# Patient Record
Sex: FEMALE | Race: WHITE | NOT HISPANIC OR LATINO | URBAN - METROPOLITAN AREA
[De-identification: names, ages, dates, MRNs, and addresses within clinical notes are randomized per-mention and may not be internally consistent; named-entity substitution may affect disease eponyms.]

---

## 2018-01-05 ENCOUNTER — INPATIENT (INPATIENT)
Facility: HOSPITAL | Age: 38
LOS: 1 days | Discharge: HOME | End: 2018-01-07
Attending: OBSTETRICS & GYNECOLOGY | Admitting: OBSTETRICS & GYNECOLOGY
Payer: COMMERCIAL

## 2018-01-05 VITALS — WEIGHT: 134.48 LBS | HEIGHT: 63 IN

## 2018-01-05 DIAGNOSIS — O26.899 OTHER SPECIFIED PREGNANCY RELATED CONDITIONS, UNSPECIFIED TRIMESTER: ICD-10-CM

## 2018-01-05 DIAGNOSIS — Z3A.00 WEEKS OF GESTATION OF PREGNANCY NOT SPECIFIED: ICD-10-CM

## 2018-01-05 LAB
BASOPHILS NFR BLD AUTO: 0.3 % — SIGNIFICANT CHANGE UP (ref 0–2)
BLD GP AB SCN SERPL QL: NEGATIVE — SIGNIFICANT CHANGE UP
EOSINOPHIL NFR BLD AUTO: 0.3 % — SIGNIFICANT CHANGE UP (ref 0–6)
HCT VFR BLD CALC: 38.8 % — SIGNIFICANT CHANGE UP (ref 34.5–45)
HGB BLD-MCNC: 13.3 G/DL — SIGNIFICANT CHANGE UP (ref 11.5–15.5)
HIV 1+2 AB+HIV1 P24 AG SERPL QL IA: SIGNIFICANT CHANGE UP
LYMPHOCYTES # BLD AUTO: 19.7 % — SIGNIFICANT CHANGE UP (ref 13–44)
MCHC RBC-ENTMCNC: 32.1 PG — SIGNIFICANT CHANGE UP (ref 27–34)
MCHC RBC-ENTMCNC: 34.3 G/DL — SIGNIFICANT CHANGE UP (ref 32–36)
MCV RBC AUTO: 93.7 FL — SIGNIFICANT CHANGE UP (ref 80–100)
MONOCYTES NFR BLD AUTO: 6.8 % — SIGNIFICANT CHANGE UP (ref 2–14)
NEUTROPHILS NFR BLD AUTO: 72.9 % — SIGNIFICANT CHANGE UP (ref 43–77)
PLATELET # BLD AUTO: 132 K/UL — LOW (ref 150–400)
RBC # BLD: 4.14 M/UL — SIGNIFICANT CHANGE UP (ref 3.8–5.2)
RBC # FLD: 14.1 % — SIGNIFICANT CHANGE UP (ref 10.3–16.9)
RH IG SCN BLD-IMP: POSITIVE — SIGNIFICANT CHANGE UP
T PALLIDUM AB TITR SER: NEGATIVE — SIGNIFICANT CHANGE UP
WBC # BLD: 8 K/UL — SIGNIFICANT CHANGE UP (ref 3.8–10.5)
WBC # FLD AUTO: 8 K/UL — SIGNIFICANT CHANGE UP (ref 3.8–10.5)

## 2018-01-05 RX ORDER — DIPHENHYDRAMINE HCL 50 MG
25 CAPSULE ORAL EVERY 6 HOURS
Qty: 0 | Refills: 0 | Status: DISCONTINUED | OUTPATIENT
Start: 2018-01-05 | End: 2018-01-07

## 2018-01-05 RX ORDER — HYDROCORTISONE 1 %
1 OINTMENT (GRAM) TOPICAL EVERY 4 HOURS
Qty: 0 | Refills: 0 | Status: DISCONTINUED | OUTPATIENT
Start: 2018-01-05 | End: 2018-01-07

## 2018-01-05 RX ORDER — SODIUM CHLORIDE 9 MG/ML
1000 INJECTION, SOLUTION INTRAVENOUS
Qty: 0 | Refills: 0 | Status: DISCONTINUED | OUTPATIENT
Start: 2018-01-05 | End: 2018-01-05

## 2018-01-05 RX ORDER — CITRIC ACID/SODIUM CITRATE 300-500 MG
15 SOLUTION, ORAL ORAL ONCE
Qty: 0 | Refills: 0 | Status: DISCONTINUED | OUTPATIENT
Start: 2018-01-05 | End: 2018-01-05

## 2018-01-05 RX ORDER — DIBUCAINE 1 %
1 OINTMENT (GRAM) RECTAL EVERY 4 HOURS
Qty: 0 | Refills: 0 | Status: DISCONTINUED | OUTPATIENT
Start: 2018-01-05 | End: 2018-01-07

## 2018-01-05 RX ORDER — MAGNESIUM HYDROXIDE 400 MG/1
30 TABLET, CHEWABLE ORAL
Qty: 0 | Refills: 0 | Status: DISCONTINUED | OUTPATIENT
Start: 2018-01-05 | End: 2018-01-07

## 2018-01-05 RX ORDER — OXYTOCIN 10 UNIT/ML
333.33 VIAL (ML) INJECTION
Qty: 20 | Refills: 0 | Status: DISCONTINUED | OUTPATIENT
Start: 2018-01-05 | End: 2018-01-05

## 2018-01-05 RX ORDER — SODIUM CHLORIDE 9 MG/ML
3 INJECTION INTRAMUSCULAR; INTRAVENOUS; SUBCUTANEOUS EVERY 8 HOURS
Qty: 0 | Refills: 0 | Status: DISCONTINUED | OUTPATIENT
Start: 2018-01-05 | End: 2018-01-07

## 2018-01-05 RX ORDER — ACETAMINOPHEN 500 MG
650 TABLET ORAL EVERY 6 HOURS
Qty: 0 | Refills: 0 | Status: DISCONTINUED | OUTPATIENT
Start: 2018-01-05 | End: 2018-01-07

## 2018-01-05 RX ORDER — PENICILLIN G POTASSIUM 5000000 [IU]/1
2.5 POWDER, FOR SOLUTION INTRAMUSCULAR; INTRAPLEURAL; INTRATHECAL; INTRAVENOUS EVERY 4 HOURS
Qty: 0 | Refills: 0 | Status: DISCONTINUED | OUTPATIENT
Start: 2018-01-05 | End: 2018-01-05

## 2018-01-05 RX ORDER — PRAMOXINE HYDROCHLORIDE 150 MG/15G
1 AEROSOL, FOAM RECTAL EVERY 4 HOURS
Qty: 0 | Refills: 0 | Status: DISCONTINUED | OUTPATIENT
Start: 2018-01-05 | End: 2018-01-07

## 2018-01-05 RX ORDER — IBUPROFEN 200 MG
600 TABLET ORAL EVERY 6 HOURS
Qty: 0 | Refills: 0 | Status: DISCONTINUED | OUTPATIENT
Start: 2018-01-05 | End: 2018-01-07

## 2018-01-05 RX ORDER — TETANUS TOXOID, REDUCED DIPHTHERIA TOXOID AND ACELLULAR PERTUSSIS VACCINE, ADSORBED 5; 2.5; 8; 8; 2.5 [IU]/.5ML; [IU]/.5ML; UG/.5ML; UG/.5ML; UG/.5ML
0.5 SUSPENSION INTRAMUSCULAR ONCE
Qty: 0 | Refills: 0 | Status: COMPLETED | OUTPATIENT
Start: 2018-01-05 | End: 2018-12-04

## 2018-01-05 RX ORDER — OXYTOCIN 10 UNIT/ML
41.67 VIAL (ML) INJECTION
Qty: 20 | Refills: 0 | Status: DISCONTINUED | OUTPATIENT
Start: 2018-01-05 | End: 2018-01-07

## 2018-01-05 RX ORDER — GLYCERIN ADULT
1 SUPPOSITORY, RECTAL RECTAL AT BEDTIME
Qty: 0 | Refills: 0 | Status: DISCONTINUED | OUTPATIENT
Start: 2018-01-05 | End: 2018-01-07

## 2018-01-05 RX ORDER — DOCUSATE SODIUM 100 MG
100 CAPSULE ORAL
Qty: 0 | Refills: 0 | Status: DISCONTINUED | OUTPATIENT
Start: 2018-01-05 | End: 2018-01-07

## 2018-01-05 RX ORDER — PENICILLIN G POTASSIUM 5000000 [IU]/1
POWDER, FOR SOLUTION INTRAMUSCULAR; INTRAPLEURAL; INTRATHECAL; INTRAVENOUS
Qty: 0 | Refills: 0 | Status: DISCONTINUED | OUTPATIENT
Start: 2018-01-05 | End: 2018-01-05

## 2018-01-05 RX ORDER — OXYCODONE AND ACETAMINOPHEN 5; 325 MG/1; MG/1
2 TABLET ORAL EVERY 6 HOURS
Qty: 0 | Refills: 0 | Status: DISCONTINUED | OUTPATIENT
Start: 2018-01-05 | End: 2018-01-07

## 2018-01-05 RX ORDER — SODIUM CHLORIDE 9 MG/ML
1000 INJECTION, SOLUTION INTRAVENOUS ONCE
Qty: 0 | Refills: 0 | Status: DISCONTINUED | OUTPATIENT
Start: 2018-01-05 | End: 2018-01-05

## 2018-01-05 RX ORDER — OXYTOCIN 10 UNIT/ML
1 VIAL (ML) INJECTION
Qty: 30 | Refills: 0 | Status: DISCONTINUED | OUTPATIENT
Start: 2018-01-05 | End: 2018-01-07

## 2018-01-05 RX ORDER — AER TRAVELER 0.5 G/1
1 SOLUTION RECTAL; TOPICAL EVERY 4 HOURS
Qty: 0 | Refills: 0 | Status: DISCONTINUED | OUTPATIENT
Start: 2018-01-05 | End: 2018-01-07

## 2018-01-05 RX ORDER — SIMETHICONE 80 MG/1
80 TABLET, CHEWABLE ORAL EVERY 6 HOURS
Qty: 0 | Refills: 0 | Status: DISCONTINUED | OUTPATIENT
Start: 2018-01-05 | End: 2018-01-07

## 2018-01-05 RX ORDER — PENICILLIN G POTASSIUM 5000000 [IU]/1
5 POWDER, FOR SOLUTION INTRAMUSCULAR; INTRAPLEURAL; INTRATHECAL; INTRAVENOUS ONCE
Qty: 0 | Refills: 0 | Status: COMPLETED | OUTPATIENT
Start: 2018-01-05 | End: 2018-01-05

## 2018-01-05 RX ORDER — LANOLIN
1 OINTMENT (GRAM) TOPICAL EVERY 6 HOURS
Qty: 0 | Refills: 0 | Status: DISCONTINUED | OUTPATIENT
Start: 2018-01-05 | End: 2018-01-07

## 2018-01-05 RX ADMIN — PENICILLIN G POTASSIUM 200 MILLION UNIT(S): 5000000 POWDER, FOR SOLUTION INTRAMUSCULAR; INTRAPLEURAL; INTRATHECAL; INTRAVENOUS at 17:31

## 2018-01-05 RX ADMIN — SODIUM CHLORIDE 125 MILLILITER(S): 9 INJECTION, SOLUTION INTRAVENOUS at 09:48

## 2018-01-05 RX ADMIN — PENICILLIN G POTASSIUM 200 MILLION UNIT(S): 5000000 POWDER, FOR SOLUTION INTRAMUSCULAR; INTRAPLEURAL; INTRATHECAL; INTRAVENOUS at 13:24

## 2018-01-05 RX ADMIN — PENICILLIN G POTASSIUM 200 MILLION UNIT(S): 5000000 POWDER, FOR SOLUTION INTRAMUSCULAR; INTRAPLEURAL; INTRATHECAL; INTRAVENOUS at 09:49

## 2018-01-05 RX ADMIN — Medication 1 MILLIUNIT(S)/MIN: at 11:12

## 2018-01-05 NOTE — DISCHARGE NOTE OB - MATERIALS PROVIDED
Bottle Feeding Log/[facility] Hearing Screen Program/Shaken Baby Prevention Handout/Breastfeeding Guide and Packet/Tdap Vaccination (VIS Pub Date: 2012)/Vaccinations/[facility]  Screening Program/Breastfeeding Mother’s Support Group Information/Guide to Postpartum Care/Back To Sleep Handout/Discharge Medication Information for Patients and Families Pocket Guide/  Immunization Record/Breastfeeding Log/Birth Certificate Instructions

## 2018-01-05 NOTE — DISCHARGE NOTE OB - PATIENT PORTAL LINK FT
“You can access the FollowHealth Patient Portal, offered by [facility], by registering with the following website: [email or web address]”

## 2018-01-05 NOTE — DISCHARGE NOTE OB - CARE PROVIDER_API CALL
Omid Woodward), Obstetrics and Gynecology  [id number]  [address]  [place] [address]  Phone: [phone]  Fax: [phone]

## 2018-01-05 NOTE — DISCHARGE NOTE OB - CARE PLAN
Principal Discharge DX:	Postpartum state  Goal:	home  Instructions for follow-up, activity and diet:	md office in 6 weeks

## 2018-01-06 RX ADMIN — Medication 600 MILLIGRAM(S): at 08:15

## 2018-01-06 RX ADMIN — Medication 1 APPLICATION(S): at 07:36

## 2018-01-06 RX ADMIN — Medication 1 TABLET(S): at 12:17

## 2018-01-06 RX ADMIN — SODIUM CHLORIDE 3 MILLILITER(S): 9 INJECTION INTRAMUSCULAR; INTRAVENOUS; SUBCUTANEOUS at 06:52

## 2018-01-06 RX ADMIN — SODIUM CHLORIDE 3 MILLILITER(S): 9 INJECTION INTRAMUSCULAR; INTRAVENOUS; SUBCUTANEOUS at 22:07

## 2018-01-06 RX ADMIN — SODIUM CHLORIDE 3 MILLILITER(S): 9 INJECTION INTRAMUSCULAR; INTRAVENOUS; SUBCUTANEOUS at 14:00

## 2018-01-06 RX ADMIN — Medication 100 MILLIGRAM(S): at 12:18

## 2018-01-06 RX ADMIN — Medication 600 MILLIGRAM(S): at 07:36

## 2018-01-06 NOTE — LACTATION INITIAL EVALUATION - INFANT FEEDING PLAN COMMENT, OB PROFILE
Baby 37 weeks, spontaneous labor, 5lbs 15, 19 hours old, 1 urine, 2 stools. Mother exclusively  previous infant for 15 months successfully. Difficulties with fast letdown. Reports this baby latching well with strong sustained latch. No discomfort during feedings. Given mother's experience level, report and weight and diaper count, supplementation/triple feeding not necessary at this time. LC will follow up tomorrow.

## 2018-01-06 NOTE — PROGRESS NOTE ADULT - SUBJECTIVE AND OBJECTIVE BOX
Patient evaluated at bedside.  No acute events overnight.  She reports pain is well controlled with OPM.  She denies heavy vaginal bleeding or perineal discomfort.  She has been ambulating without assistance, voiding spontaneously, and is breastfeeding.    Physical Exam:  T(C): 36.7 (01-06-18 @ 00:04), Max: 36.7 (01-06-18 @ 00:04)  HR: 72 (01-06-18 @ 00:04) (72 - 100)  BP: 110/65 (01-06-18 @ 00:04) (100/59 - 110/65)  RR: 18 (01-06-18 @ 00:04) (17 - 18)  SpO2: --  Wt(kg): --    GA: NAD, AAOx3  Abd: soft, nontender, nondistended, no rebound or guarding, uterus firm at midline,   fundus below umbilicus  : lochia WNL  Extremities: no swelling or calf tenderness                            13.3   8.0   )-----------( 132      ( 05 Jan 2018 09:57 )             38.8

## 2018-01-07 VITALS
SYSTOLIC BLOOD PRESSURE: 94 MMHG | DIASTOLIC BLOOD PRESSURE: 60 MMHG | HEART RATE: 60 BPM | OXYGEN SATURATION: 99 % | RESPIRATION RATE: 18 BRPM | TEMPERATURE: 98 F

## 2018-01-07 PROCEDURE — 85025 COMPLETE CBC W/AUTO DIFF WBC: CPT

## 2018-01-07 PROCEDURE — 86850 RBC ANTIBODY SCREEN: CPT

## 2018-01-07 PROCEDURE — 86780 TREPONEMA PALLIDUM: CPT

## 2018-01-07 PROCEDURE — 86901 BLOOD TYPING SEROLOGIC RH(D): CPT

## 2018-01-07 PROCEDURE — 87389 HIV-1 AG W/HIV-1&-2 AB AG IA: CPT

## 2018-01-07 PROCEDURE — 90715 TDAP VACCINE 7 YRS/> IM: CPT

## 2018-01-07 PROCEDURE — 90686 IIV4 VACC NO PRSV 0.5 ML IM: CPT

## 2018-01-07 PROCEDURE — 99214 OFFICE O/P EST MOD 30 MIN: CPT

## 2018-01-07 PROCEDURE — 36415 COLL VENOUS BLD VENIPUNCTURE: CPT

## 2018-01-07 PROCEDURE — 86900 BLOOD TYPING SEROLOGIC ABO: CPT

## 2018-01-07 RX ORDER — MULTIVIT-MIN/FERROUS GLUCONATE 9 MG/15 ML
1 LIQUID (ML) ORAL
Qty: 0 | Refills: 0 | COMMUNITY

## 2018-01-07 RX ORDER — TETANUS TOXOID, REDUCED DIPHTHERIA TOXOID AND ACELLULAR PERTUSSIS VACCINE, ADSORBED 5; 2.5; 8; 8; 2.5 [IU]/.5ML; [IU]/.5ML; UG/.5ML; UG/.5ML; UG/.5ML
0.5 SUSPENSION INTRAMUSCULAR ONCE
Qty: 0 | Refills: 0 | Status: COMPLETED | OUTPATIENT
Start: 2018-01-07 | End: 2018-01-07

## 2018-01-07 RX ORDER — INFLUENZA VIRUS VACCINE 15; 15; 15; 15 UG/.5ML; UG/.5ML; UG/.5ML; UG/.5ML
0.5 SUSPENSION INTRAMUSCULAR ONCE
Qty: 0 | Refills: 0 | Status: COMPLETED | OUTPATIENT
Start: 2018-01-07 | End: 2018-01-07

## 2018-01-07 RX ADMIN — Medication 600 MILLIGRAM(S): at 00:11

## 2018-01-07 RX ADMIN — INFLUENZA VIRUS VACCINE 0.5 MILLILITER(S): 15; 15; 15; 15 SUSPENSION INTRAMUSCULAR at 11:17

## 2018-01-07 RX ADMIN — Medication 600 MILLIGRAM(S): at 01:01

## 2018-01-07 RX ADMIN — Medication 1 TABLET(S): at 11:27

## 2018-01-07 RX ADMIN — TETANUS TOXOID, REDUCED DIPHTHERIA TOXOID AND ACELLULAR PERTUSSIS VACCINE, ADSORBED 0.5 MILLILITER(S): 5; 2.5; 8; 8; 2.5 SUSPENSION INTRAMUSCULAR at 11:16

## 2018-01-07 RX ADMIN — Medication 600 MILLIGRAM(S): at 06:47

## 2018-01-07 RX ADMIN — Medication 600 MILLIGRAM(S): at 05:53

## 2018-01-07 RX ADMIN — SODIUM CHLORIDE 3 MILLILITER(S): 9 INJECTION INTRAMUSCULAR; INTRAVENOUS; SUBCUTANEOUS at 06:52

## 2018-01-07 NOTE — PROGRESS NOTE ADULT - ASSESSMENT
A/P 37y s/p , PPD # 2 , stable, meeting postpartum milestones   - Pain: well controlled on motrin  - GI: Tolerating regular diet, colace PRN  - : urinating without difficulty/pain  -DVT prophylaxis: ambulating frequently  -Dispo: PPD 2, ready to go home today, leg pain likely secondary to strain/muscle cramp, reevaul prior to discharge

## 2018-01-07 NOTE — PROVIDER CONTACT NOTE (OTHER) - SITUATION
Patient reports left calf strain discomfort. Denies pain. No swelling, redness, negative homen's sign. Denies SOB, chest pain.

## 2018-01-07 NOTE — PROGRESS NOTE ADULT - SUBJECTIVE AND OBJECTIVE BOX
Patient evaluated at bedside this morning, resting comfortable in bed, no acute events overnight.  She reports pain is well controlled with motrin. Reported this morning to nursing she had some left calf pain when walking that felt like soarness. Denies any pain when sitting still , no tenderness or redness on legs.   She denies headache, dizziness, chest pain, palpitations, shortness of breath, nausea, vomiting, heavy vaginal bleeding or perineal discomfort. Reports decrease in amount of vaginal bleeding and denies clots.  She has been ambulating without assistance, voiding spontaneously, and is breastfeeding.   Tolerating food well, without nausea/vomit.  Passing flatus.     Physical Exam:  T(C): 36.6 (01-07-18 @ 06:00), Max: 36.6 (01-07-18 @ 06:00)  HR: 60 (01-07-18 @ 06:00) (60 - 60)  BP: 94/60 (01-07-18 @ 06:00) (94/60 - 94/60)  RR: 18 (01-07-18 @ 06:00) (18 - 18)  SpO2: 99% (01-07-18 @ 06:00) (99% - 99%)    GA: NAD, A&O x 3  CV: RRR, no murmurs, rubs, or gallops  Pulm: clear breath sounds throughout, no rales, rhonchi, wheezes  Abd: + BS, soft, nontender, nondistended, no rebound or guarding, uterus firm at midline, uterus firm and   2 fb below umbilicus  Extremities: no swelling or calf tenderness, Citlalli negative            acetaminophen   Tablet 650 milliGRAM(s) Oral every 6 hours PRN  acetaminophen   Tablet. 650 milliGRAM(s) Oral every 6 hours PRN  dibucaine 1% Ointment 1 Application(s) Topical every 4 hours PRN  diphenhydrAMINE   Capsule 25 milliGRAM(s) Oral every 6 hours PRN  diphtheria/tetanus/pertussis (acellular) Vaccine (ADAcel) 0.5 milliLiter(s) IntraMuscular once  docusate sodium 100 milliGRAM(s) Oral two times a day PRN  glycerin Suppository - Adult 1 Suppository(s) Rectal at bedtime PRN  hydrocortisone 1% Cream 1 Application(s) Topical every 4 hours PRN  ibuprofen  Tablet 600 milliGRAM(s) Oral every 6 hours PRN  influenza   Vaccine 0.5 milliLiter(s) IntraMuscular once  lanolin Ointment 1 Application(s) Topical every 6 hours PRN  magnesium hydroxide Suspension 30 milliLiter(s) Oral two times a day PRN  oxyCODONE    5 mG/acetaminophen 325 mG 2 Tablet(s) Oral every 6 hours PRN  oxytocin Infusion 41.667 milliUNIT(s)/Min IV Continuous <Continuous>  oxytocin Infusion 1 milliUNIT(s)/Min IV Continuous <Continuous>  pramoxine 1%/zinc 5% Cream 1 Application(s) Topical every 4 hours PRN  prenatal multivitamin 1 Tablet(s) Oral daily  simethicone 80 milliGRAM(s) Chew every 6 hours PRN  sodium chloride 0.9% lock flush 3 milliLiter(s) IV Push every 8 hours  witch hazel Pads 1 Application(s) Topical every 4 hours PRN

## 2018-01-09 DIAGNOSIS — Z23 ENCOUNTER FOR IMMUNIZATION: ICD-10-CM

## 2018-01-09 DIAGNOSIS — Z3A.37 37 WEEKS GESTATION OF PREGNANCY: ICD-10-CM

## 2018-01-09 DIAGNOSIS — Z34.83 ENCOUNTER FOR SUPERVISION OF OTHER NORMAL PREGNANCY, THIRD TRIMESTER: ICD-10-CM

## 2019-02-01 NOTE — PATIENT PROFILE OB - SPIRITUAL CULTURAL, RELIGIOUS PRACTICES/VALUES, PROFILE
Quality 226: Preventive Care And Screening: Tobacco Use: Screening And Cessation Intervention: Patient screened for tobacco use and is an ex/non-smoker Quality 130: Documentation Of Current Medications In The Medical Record: Current Medications Documented Detail Level: Detailed Quality 431: Preventive Care And Screening: Unhealthy Alcohol Use - Screening: Patient screened for unhealthy alcohol use using a single question and scores 2 or greater episodes per year and brief intervention did not occur Additional Notes: No flu vaccine due to personal choice Quality 110: Preventive Care And Screening: Influenza Immunization: Influenza Immunization not Administered for Documented Reasons. none

## 2020-03-01 ENCOUNTER — EMERGENCY (EMERGENCY)
Facility: HOSPITAL | Age: 40
LOS: 1 days | Discharge: HOME | End: 2020-03-01
Admitting: EMERGENCY MEDICINE
Payer: COMMERCIAL

## 2020-03-01 VITALS
WEIGHT: 104.06 LBS | DIASTOLIC BLOOD PRESSURE: 86 MMHG | HEART RATE: 80 BPM | OXYGEN SATURATION: 100 % | HEIGHT: 63 IN | RESPIRATION RATE: 18 BRPM | SYSTOLIC BLOOD PRESSURE: 132 MMHG | TEMPERATURE: 99 F
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
## 2020-03-01 DIAGNOSIS — K13.0 DISEASES OF LIPS: ICD-10-CM
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
## 2020-03-01 DIAGNOSIS — S01.511A LACERATION WITHOUT FOREIGN BODY OF LIP, INITIAL ENCOUNTER: ICD-10-CM

## 2020-03-01 PROCEDURE — 13152 CMPLX RPR E/N/E/L 2.6-7.5 CM: CPT

## 2020-03-01 PROCEDURE — 99284 EMERGENCY DEPT VISIT MOD MDM: CPT

## 2020-03-01 PROCEDURE — 99285 EMERGENCY DEPT VISIT HI MDM: CPT | Mod: 25

## 2020-03-01 PROCEDURE — 13132 CMPLX RPR F/C/C/M/N/AX/G/H/F: CPT

## 2020-03-01 NOTE — ED PROVIDER NOTE - SKIN, MLM
Skin normal color for race, warm, dry and intact. No evidence of rash. 1 and 1/2 cm laceration at the inferior to the lip that goes through and through Skin normal color for race, warm, dry and intact. No evidence of rash. 1 and 1/2 cm laceration  inferior to the lip that goes through and through

## 2020-03-01 NOTE — ED PROVIDER NOTE - ENMT, MLM
Airway patent, Nasal mucosa clear. Mouth with normal mucosa. Throat has no vesicles, no oropharyngeal exudates and uvula is midline. No broken teeth.

## 2020-03-01 NOTE — ED PROVIDER NOTE - NSFOLLOWUPINSTRUCTIONS_ED_ALL_ED_FT
Follow up with Dr. Brock this week. Continue current antibiotics until finish.     Mouth Laceration  A mouth laceration is a deep cut inside your mouth. The cut may go into your lip or go all the way through your mouth and cheek. The cut may also affect your tongue, the insides of your cheeks, or the upper surface of your mouth (palate).  Mouth lacerations may bleed a lot and may need to be treated with stitches (sutures).  Follow these instructions at home:  Medicines     Take over-the-counter and prescription medicines only as told by your doctor.If you were given an antibiotic medicine, take or apply it as told by your doctor. Do not stop using the antibiotic even if you start to feel better.Do not drive or use heavy machinery while taking prescription pain medicine.Wound care     Check your wound every day for signs of infection. It is normal to have a white or gray patch over your wound while it heals. Watch for:  Redness.Swelling.Blood or pus.Gently gargle and rinse your mouth 4–6 times a day. Spit out the liquid. Do not swallow.Use the rinse solution as told by your doctor. The most used types of rinse include:  Antibacterial rinse (chlorhexidine).Saline rinse.Do not poke the stitches with your tongue. Doing that can loosen them.If you have a cut on your lip:  Keep the wound fully dry for the first 24 hours, or as told by your doctor. After that time, you may take a shower or a bath. Do not get the wound soaked in water until after the stitches have been removed.If you were given a bandage, change it at least once a day, or as told by your doctor. You should also change it if it gets wet or dirty.Clean the wound once a day, or as told by your doctor.After you clean the wound, put a thin layer of antibiotic ointment on it as told by your doctor. This ointment:  Helps to prevent infection.Keeps the bandage from sticking to the wound.General instructions        Eat a soft diet. Avoid hot foods or liquids for a few days.Rinse your mouth after eating.Keep your mouth and teeth clean (oral hygiene). Gently brush your teeth with a soft toothbrush 2 times a day.Do not use any products that contain nicotine or tobacco, such as cigarettes and e-cigarettes. These can delay healing. If you need help quitting, ask your doctor.Keep all follow-up visits as told by your doctor. This is important.Contact a doctor if:  Medicine does not help your pain.You have a fever.You have redness, swelling, or pain on your wound that is getting worse.You have fresh bleeding or pus coming from your wound.You have swollen or tender glands in your throat.Get help right away if:  The edges of your wound break open.Your face or the area under your jaw gets swollen.You have trouble breathing or swallowing.Summary  A mouth laceration is a deep cut inside your mouth.Mouth lacerations may bleed a lot and may need to be treated with stitches.Check your wound every day for signs of infection.Contact a doctor if you have fresh bleeding or you notice that pus is coming out of your wound.This information is not intended to replace advice given to you by your health care provider. Make sure you discuss any questions you have with your health care provider.    Document Released: 06/05/2009 Document Revised: 01/07/2019 Document Reviewed: 01/07/2019  [employer] Interactive Patient Education © 2020 [employer]

## 2020-03-01 NOTE — ED PROVIDER NOTE - PATIENT PORTAL LINK FT
You can access the FollowMyHealth Patient Portal offered by [facility] by registering at the following website: [email or web address]. By joining [email or web address]’s FollowMyHealth portal, you will also be able to view your health information using other applications (apps) compatible with our system.

## 2020-03-01 NOTE — ED PROVIDER NOTE - CLINICAL SUMMARY MEDICAL DECISION MAKING FREE TEXT BOX
Patient with a lip laceration was repaired by plastics. Dr. Brock. Well appearing, NAD and VSS. Td up to date.

## 2020-03-01 NOTE — ED PROVIDER NOTE - OBJECTIVE STATEMENT
39y F presents to the ED with complains of lip injury. Patient states she bent over to pick something up and hit her face on a chair. Patient states tetanus is up to date. Patient notes having oral surgery on Thursday and is on amoxicillin. Denies jaw pain. 39y F presents to the ED with complains of lip injury. Patient states she bent over to pick something up and hit her face on a chair. Patient states tetanus is up to date. Patient notes having oral surgery on Thursday and is currently on amoxicillin. Denies jaw pain, fever, drainage, numbness or tingling. No loose teeth.

## 2020-03-01 NOTE — ED PROVIDER NOTE - MUSCULOSKELETAL, MLM
Spine appears normal, range of motion is not limited, no muscle or joint tenderness; no c spine tenderness; no pain to TMJ; no palpable defect to submandible, nontender

## 2020-03-01 NOTE — ED ADULT NURSE NOTE - OBJECTIVE STATEMENT
c.o lac to bottom lip after bumping her lip into a chair. denies any loc, dizziness, use of blood thinners. no active bleeding noted. states she is UTD with tetanus vaccines

## 2021-06-26 NOTE — PATIENT PROFILE OB - AMNIOTIC FLUID ODOR, LABOR
Spoke with patient and he actually is taking the topamax 100 mg daily. Notified he does not need to  the prescription then that was sent today. Notified patient that per verbal from Adela, he will need to discuss different treatment options with him at this appointment.  Patient notified and verbalized understanding of information given, does not have any further questions at this time.    normal

## 2023-04-19 NOTE — ED ADULT NURSE NOTE - NS ED NURSE RECORD ANOTHER HT AND WT
"NAME: Yadira Flowers    : 1956    DATE:  2023    DIAGNOSIS:   1.  Stage IA ([id number]) moderately differentiated invasive ductal carcinoma of the right breast diagnosed in 2015.    2.  H/o malignant Melanoma on her Back    3.  H/o cervical cancer treated with cryotherapy at [facility] in     4.  Recurrent / metastatic malignant Melanoma  -  R axillary LN bx 22.    -  She has R axillary LN mass (2.47x2.16), Solitary cavitary R lobe liver lesion, cavitary RLL lung mass,  and bilateral parenchymal lung nodules      TREATMENT HISTORY:   1.         CHIEF COMPLAINT:  Follow up of metastatic melanoma/toxicity check and recent imaging    HISTORY OF PRESENT ILLNESS:   Yadira Flowers is a very pleasant 66 y.o. female who was referred by Dr. Dilcia Pedro for evaluation and treatment of breast cancer. She was living in [place] in 2015 when her dog brought attention to and \"found\" an abnormal lump in her right breast.  She was treated by Dr. Steve Isbell and underwent R lumpectomy with SLNBx on 10-22-15 as below.  She then had what sounds like accelerated partial breast irradiation.  She was then started on Arimidex which was later switched to Exemestane for a better side effect profile.  She took this for about 5 years, stopping a couple of weeks ago.  She stopped taking Exemestane because she developed some vaginal bleeding and hematuria.  This was evaluated by her PCP and gynecologist and felt to be due to vaginal atrophy.  She was prescribed a hormone pill which she hasn't yet started and was referred here to discuss things further.      Aside from bleeding which has been uncomfortable and distressing for her, Ms. Onur Burgos has generally been well.  The last year has been hard on her emotionally with the loss of her  and then her dog, her home and her insurance, but she is coping well and has good family support in [place].  She says she gained weight with her breast " cancer treatment and gained weight when her  .  She has been working now to lose weight and has lost 20 lbs over the last 2 months with dieting.  She denies chest pain or shortness of breath.  She complains of some RLQ cramping pain and isn't sure what is causing that.  She denies difficulty moving her bowels.  No blood in her stool that she is aware of.  She has had vaginal bleeding and hematuria as above.        INTERVAL HISTORY:  Ms. Burgos presents today for follow up of metastatic melanoma/toxicity check and recent imaging.  Since her last visit, she has overall been well.  She continues to take Nivolumab and is tolerating this well. She is anxious to discuss recent imaging.  Her only other complaint is that she has had some difficulty w/ acid reflux.  She started Protonix 20 mg QAM which has been helpful.        PAST MEDICAL HISTORY:  Past Medical History:   Diagnosis Date   • Back pain    • Basal cell carcinoma (BCC) in situ of skin     Dr. Mohr - Derm   • Breast cancer        • Cancer     breast, cervical, melanoma   • Cervical cancer     Diagnosed in .  Treated by repeated local intervention and ultimately received cryotherapy at [facility] with resolution.   • Elbow fracture    • Foot fracture    • Headache    • Hypertension     TAKEN OFF MEDS   • Melanoma     on her back  - treated by Dermatologist Catrachito Amaro in [place], [place] with what sounds like wide local excision.    • Pulmonary embolism    • Sinusitis        PAST SURGICAL HISTORY:  Past Surgical History:   Procedure Laterality Date   • AXILLARY LYMPH NODE BIOPSY/EXCISION Right 2022    Procedure: AXILLARY LYMPH NODE BIOPSY/EXCISION;  Surgeon: Dianelys Cummings MD;  Location: [facility];  Service: General;  Laterality: Right;   • BREAST LUMPECTOMY Right    • BREAST SURGERY      Secondary to cancer   • BRONCHOSCOPY Right 2022    Procedure: BRONCHOSCOPY WITH ENDOBRONCHIAL ULTRASOUND;  Surgeon: Chris Her  MD;  Location:  [facility];  Service: Pulmonary;  Laterality: Right;   • COLONOSCOPY N/A 2020    Procedure: COLONOSCOPY;  Surgeon: Mohsen Maldonado MD;  Location:  [facility];  Service: Gastroenterology;  Laterality: N/A;   • ELBOW FUSION      left   • FOOT SURGERY Right     Broken foot   • HAND SURGERY  2020    right   • RHINOPLASTY     • SKIN CANCER EXCISION      malignant melanoma from back    • US GUIDED LYMPH NODE BIOPSY  2022   • VENOUS ACCESS DEVICE (PORT) INSERTION N/A 2022    Procedure: INSERTION VENOUS ACCESS DEVICE left or right;  Surgeon: Dianelys Cummings MD;  Location:  [facility];  Service: General;  Laterality: N/A;       FAMILY HISTORY:  Family History   Problem Relation Age of Onset   • Other Mother         blood clots   • Hypertension Mother    • Ulcers Father    • Hypertension Sister    • Other Sister         Multiple Sclerosis   • Cancer Paternal Aunt    • Stroke Maternal Grandmother    • Alcohol abuse Paternal Grandmother    • Hypertension Sister    • Multiple sclerosis Sister    • Hypertension Sister    • Coronary artery disease Paternal Grandfather    • Cancer Maternal Aunt 30        colon   • Breast cancer Neg Hx    Many paternal aunts  with malignancy of various types. Many cousins on that side of the family have had cancer as well.  One  of colon cancer in her 30s.  One had a brain tumor.    SOCIAL HISTORY:  Social History     Socioeconomic History   • Marital status:    Tobacco Use   • Smoking status: Never   • Smokeless tobacco: Never   Vaping Use   • Vaping Use: Never used   Substance and Sexual Activity   • Alcohol use: Not Currently   • Drug use: Never   • Sexual activity: Not Currently     Birth control/protection: Post-menopausal       REVIEW OF SYSTEMS:   A comprehensive 14 point review of systems was performed.  Significant findings as mentioned above.  All other systems reviewed and are negative.      MEDICATIONS:  The current  medication list was reviewed in the EMR    Current Outpatient Medications:   •  apixaban (ELIQUIS) 2.5 MG tablet tablet, Take 1 tablet by mouth 2 (Two) Times a Day., Disp: 60 tablet, Rfl: 5  •  fluticasone (Flonase) 50 MCG/ACT nasal spray, 2 sprays into the nostril(s) as directed by provider Daily., Disp: 18.2 mL, Rfl: 3  •  lidocaine-prilocaine (EMLA) 2.5-2.5 % cream, Apply to port a cath ~30 min -1 hour prior to chemotherapy, Disp: 30 g, Rfl: 3  •  ondansetron ODT (Zofran ODT) 8 MG disintegrating tablet, Place 1 tablet on the tongue Every 8 (Eight) Hours As Needed for Nausea or Vomiting., Disp: 30 tablet, Rfl: 5  •  pantoprazole (Protonix) 20 MG EC tablet, Take 1 tablet by mouth Daily., Disp: 30 tablet, Rfl: 2  •  prochlorperazine (COMPAZINE) 10 MG tablet, TAKE 1 TABLET BY MOUTH EVERY 6 HOURS AS NEEDED FOR NAUSEA, Disp: 360 tablet, Rfl: 3  •  sennosides-docusate (senna-docusate sodium) 8.6-50 MG per tablet, Take 2 tablets by mouth 2 (Two) Times a Day., Disp: 120 tablet, Rfl: 2    ALLERGIES:    No Known Allergies    PHYSICAL EXAM:  Vitals:    04/20/23 1253   BP: 127/78   Pulse: 78   Resp: 18   Temp: 97.5 °F (36.4 °C)   SpO2: 97%     Pain Score    04/20/23 1253   PainSc: 0-No pain     ECOG score: 0   General:  Awake, alert and oriented, appears well.  HEENT:  Pupils are equal, round and reactive to light and accommodation, Extra-ocular movements full, Oropharyx clear, mucous membranes moist  Neck:  No JVD, thyromegaly or lymphadenopathy  CV:  Regular rate and rhythm, no murmurs, rubs or gallops  Resp:  Lungs are clear to auscultation bilaterally, no wheezing  Breast: Deferred today. Previously: S/p R lumpectomy. There is a somewhat firm area at the site of surgery but there are no palpable masses.  She is s/p excision of R axillary LN, with some post-surgical swelling.  Left breast is without mass lesions.  No L axillary adenopathy.  Abd:  Soft, non-tender, non-distended, bowel sounds present, no organomegaly or  masses.    Ext:  No clubbing, cyanosis or LE edema.    Lymph:  No cervical, supraclavicular, axillary adenopathy  Neuro:  MS as above, grossly non-focal exam    PATHOLOGY:  10-22-15            12-16-21          01-13-22          ENDOSCOPY:  Colonoscopy 12-09-20     Findings: 8 hyperplastic appearing polyps of the distal rectum, diverticulosis moderately throughout the sigmoid colon      IMAGING:  CTCAP 12-15-20  On the lung windows there are several calcified  granulomas in the lungs. No noncalcified pulmonary parenchymal lung  nodules were identified. On the soft tissue windows there were no  enlarged lymph nodes in the supraclavicular or axillary regions. No  mediastinal or hilar lymphadenopathy was seen. The heart was not  enlarged. There were no pericardial effusions.     IMPRESSION:  No CT findings of metastatic disease in the chest. There are  calcified granulomas in the lungs.     CT FINDINGS: The liver and spleen were normal in size and shape. The  gallbladder contains several stones. The wall was not thickened. The  bile ducts in the liver are not dilated. There is nothing seen to  suggest metastatic disease in the liver. The pancreas shows no evidence  of mass or inflammation. No adrenal or renal lesions are demonstrated.  The aorta is normal in caliber. There were no enlarged lymph nodes in  the retroperitoneum or in the mesentery. The bowel shows no evidence of  obstruction. In the pelvis there were no masses or fluid collections.  There were no ventral hernias.     IMPRESSION:  No CT findings of metastatic disease in the abdomen or  pelvis. This study does demonstrate several stones in the lumen of the  gallbladder.         Bilateral diagnostic mammogram 01-31-21  FINDINGS: There are scattered areas of fibroglandular density.     The bilateral fibroglandular pattern does not appear significantly  changed compared to the exam from 2020. This includes postoperative  changes in the right 12:00 region. Mild  skin thickening is noted  involving the right breast which is likely treatment related. A few  bilateral scattered calcifications are noted.     IMPRESSION:  Incomplete examination as comparison with older outside  prior mammograms is needed.        BI-RADS CATEGORY:   0, INCOMPLETE:  Need prior mammograms for comparison        RECOMMENDATION:  We will attempt to obtain older outside prior  mammograms for comparison.      CTCAP 12-12-21  FINDINGS: Lack of IV contrast hinders parenchymal assessment of the mediastinum, liver, spleen, pancreas, adrenal glands and kidneys.         Significant infiltrate seen in the right lower lobe with interstitial component. There are also lung nodules present in the right lung. Index nodule in the right lung base medially is 5 mm maximal diameter. Other smaller nodules seen throughout the right  lung. Tiny 3 mm lingular nodule seen in series 3 image 30. A benign calcified granuloma seen in the left lower lobe but a noncalcified indeterminant  5 mm left lower lobe nodule seen, series 3 image 42.     Nonenlarged mediastinal adenopathy seen. However there is an enlarged right axillary 1.8 x 2.6 cm lymph node partially seen.     Worrisome hepatic metastasis or primary hepatic lesion seen measuring 4.4 x 4.1 cm in liver segment 8. Margins are ill-defined. Abscess could have a similar appearance. Suggestion of subtle gallstones. Nonobstructing 2 mm right renal calculus seen. No  obstructive uropathy. Gaseous distention of the esophagus and hiatal hernia present consistent with GERD. No enteric contrast but no gross evidence of bowel obstruction.   There is no gross free air, free fluid or focal inflammatory change.  Uterus and  adnexa are not enlarged. Osseous structures are grossly intact.     IMPRESSION:  Constellation of findings with significant consolidation in the right lower lobe, enlarged right axillary lymph node, indeterminate bilateral lung nodules and ill-defined hepatic lesion  concerning for underlying malignancy in this patient .     FINDINGS: Lack of IV contrast hinders parenchymal assessment of the mediastinum, liver, spleen, pancreas, adrenal glands and kidneys.         Significant infiltrate seen in the right lower lobe with interstitial component. There are also lung nodules present in the right lung. Index nodule in the right lung base medially is 5 mm maximal diameter. Other smaller nodules seen throughout the right  lung. Tiny 3 mm lingular nodule seen in series 3 image 30. A benign calcified granuloma seen in the left lower lobe but a noncalcified indeterminant  5 mm left lower lobe nodule seen, series 3 image 42.     Nonenlarged mediastinal adenopathy seen. However there is an enlarged right axillary 1.8 x 2.6 cm lymph node partially seen.     Worrisome hepatic metastasis or primary hepatic lesion seen measuring 4.4 x 4.1 cm in liver segment 8. Margins are ill-defined. Abscess could have a similar appearance. Suggestion of subtle gallstones. Nonobstructing 2 mm right renal calculus seen. No  obstructive uropathy. Gaseous distention of the esophagus and hiatal hernia present consistent with GERD. No enteric contrast but no gross evidence of bowel obstruction.   There is no gross free air, free fluid or focal inflammatory change.  Uterus and  adnexa are not enlarged. Osseous structures are grossly intact.     IMPRESSION:  Constellation of findings with significant consolidation in the right lower lobe, enlarged right axillary lymph node, indeterminate bilateral lung nodules and ill-defined hepatic lesion concerning for underlying malignancy in this patient .       US Abdomen Complete 12-12-21  FINDINGS:  The visualized portions of the pancreas, IVC and aorta appear normal.        The liver is moderately coarsened in echotexture. Ill-defined hypodensity in the liver measuring 3 x 3.2 x 2.6 cm corresponds to recent CT. Unclear if this is a malignant lesion. Abscesses within the  differential but prior CT was performed without any  contrast.  The common bile duct is not dilated.. Gallbladder wall is thickened at 3.4 mm with trace pericholecystic fluid and subtle hyperemia. Gallstones and shadowing present.     The kidneys are normal in size and echogenicity. There is no   hydronephrosis or focal solid lesion.  The spleen is normal in size and echotexture.     IMPRESSION:  1. Nonspecific gallbladder wall thickening and questionable pericholecystic fluid. Gallstones are better seen on CT than ultrasound.  2.  Heterogeneous liver with lesion in the liver as seen on CT. Ultrasound is not helpful in determining etiology. This could be an abscess but given the other findings on CT,  malignancy is not excluded.      CTCAP 12-15-21  FINDINGS:    LUNGS:  Increased density of the now right lower lobe mixed  groundglass and more solid-appearing consolidation with internal  cavitary component identified. Tiny right and left lung pulmonary  nodules. Grossly stable.    PLEURAL SPACE:  Small bilateral pleural effusions.  No pneumothorax.    HEART:  Unremarkable.  No cardiomegaly.  No significant pericardial  effusion.    BONES/JOINTS:  Unremarkable.  No acute fracture.  No dislocation.    SOFT TISSUES:  Unremarkable.    VASCULATURE:  Unremarkable.  No thoracic aortic aneurysm.    LYMPH NODES:  Unremarkable.  No enlarged lymph nodes.     IMPRESSION:  1.  Increased density of the now right lower lobe mixed groundglass and  more solid-appearing consolidation with internal cavitary component  identified. Tiny right and left lung pulmonary nodules. Grossly stable.  2.  Small bilateral pleural effusions.    FINDINGS:    LUNG BASES:  Unremarkable.  No mass.  No consolidation.      ABDOMEN:    LIVER:  Right lobe of liver lesion is again identified now measuring  about 4.7 cm and was about 4.7 cm.    GALLBLADDER AND BILE DUCTS:  Gallstones noted in the gallbladder.  No  ductal dilation.    PANCREAS:  Unremarkable.   No mass.  No ductal dilation.    SPLEEN:  Unremarkable.  No splenomegaly.    ADRENALS:  Unremarkable.  No mass.    KIDNEYS AND URETERS:  Unremarkable.  No solid mass.  No  hydronephrosis.    STOMACH AND BOWEL:  Unremarkable.  No obstruction.  No mucosal  thickening.      PELVIS:    APPENDIX:  No findings to suggest acute appendicitis.    BLADDER:  Unremarkable.  No mass.    REPRODUCTIVE:  Unremarkable as visualized.      ABDOMEN and PELVIS:    INTRAPERITONEAL SPACE:  Unremarkable.  No free air.  No significant  fluid collection.    BONES/JOINTS:  No acute fracture.  No dislocation.    SOFT TISSUES:  Unremarkable.    VASCULATURE:  Unremarkable.  No abdominal aortic aneurysm.    LYMPH NODES:  Unremarkable.  No enlarged lymph nodes.     IMPRESSION:    Right lobe of liver lesion is again identified now measuring about 4.7  cm and was about 4.7 cm.      NM Bone Scan Whole Body 12-15-21  FINDINGS:    SKULL/FACIAL BONES:  No focal increased or decreased uptake.    SPINE:  Unremarkable.  No abnormal increased or decreased uptake.    RIBS:  Unremarkable.  No abnormal uptake.    LONG BONES:  Unremarkable.  No abnormal uptake.    PELVIS:  Unremarkable.  No focal increased or decreased uptake.    JOINTS:  Unremarkable.  No focal increased or decreased uptake.    SOFT TISSUES:  Unremarkable.    RENAL/BLADDER:  Within normal limits.     IMPRESSION:    Normal bone scan.      CT Abdomen With Contrast  12-18-21  FINDINGS:  Partially imaged thick-walled cavitary lesion in the right lower lobe with right basilar atelectasis/infiltrate and small right pleural effusion. There is also left basilar atelectasis/infiltrate and trace left pleural effusion. Multiple noncalcified  pulmonary nodules are scattered throughout the visualized lower lung fields, concerning for pulmonary metastatic disease.     Ill-defined right hepatic mass again noted. The spleen, pancreas, and both adrenal glands are within expected limits. Cholelithiasis.  Punctate nonobstructing right intrarenal stone. Tiny left renal cyst. Kidneys are otherwise unremarkable without  hydronephrosis. Abdominal aorta normal in course and caliber without dissection. No pathologic retroperitoneal or mesenteric lymphadenopathy by size criteria. Visualized small bowel and colon are unremarkable. No bowel obstruction. No free fluid or free  air.     No aggressive osseous lesions.     IMPRESSION:     1. Ill-defined right hepatic mass again noted. This has been previously biopsied and correlation with pathology results is recommended.  2. Cholelithiasis.  3. Punctate nonobstructing right intrarenal stone.  4. No threshold abdominal lymphadenopathy or other suspicious abdominal masses.  5. Partially imaged thick-walled cavitary lesion in the right lower lobe. This may be of infectious or neoplastic etiology and continued follow-up is recommended.  6. Small bilateral pleural effusions with bibasilar atelectasis/infiltrate, worse on the right than left.  7. Numerous small noncalcified pulmonary nodules throughout the visualized lower lung fields, concerning for pulmonary metastatic disease.       Mammo Diagnostic Digital Tomosynthesis Bilateral With CAD w/ US Axilla Right 01-13-22  FINDINGS:  There are scattered areas of fibroglandular density.     Incompletely imaged is a very prominent axillary lymph node. The  bilateral fibroglandular pattern itself is stable in appearance  including postoperative changes in the superior aspect of the right  breast. There are stable bilateral calcifications. Mild skin thickening  is again noted involving the right breast which is likely treatment  related.     ULTRASOUND: Ultrasound evaluation of the right axilla demonstrates 2  markedly enlarged lymph nodes the largest of which measures 3.2 cm in  size and has no demonstrable fatty hilum. Recommend ultrasound-guided  biopsy.      IMPRESSION:  1. Stable mammographic appearance of the left breast with no  findings  suspicious for malignancy.        2. Marked right axillary lymphadenopathy. Recommend ultrasound-guided  biopsy. The fibroglandular pattern of the right breast is however  stable.        BI-RADS CATEGORY:  4, SUSPICIOUS        RECOMMENDATION:  Recommend ultrasound-guided biopsy of the dominant  abnormal appearing right axillary lymph node.      DEXA Bone Density Axial 01-13-22  IMPRESSION:  Impression:  1. According to the World Health Organization definitions of  osteoporosis based on bone density, this patient's bone mineral density  is compatible with osteoporosis and the fracture risk is high.      Mammo Diagnostic Digital Tomosynthesis Bilateral With CAD w/ US Axilla Right 01-13-22  FINDINGS:  There are scattered areas of fibroglandular density.     Incompletely imaged is a very prominent axillary lymph node. The  bilateral fibroglandular pattern itself is stable in appearance  including postoperative changes in the superior aspect of the right  breast. There are stable bilateral calcifications. Mild skin thickening  is again noted involving the right breast which is likely treatment  related.     ULTRASOUND: Ultrasound evaluation of the right axilla demonstrates 2  markedly enlarged lymph nodes the largest of which measures 3.2 cm in  size and has no demonstrable fatty hilum. Recommend ultrasound-guided  biopsy.      IMPRESSION:  1. Stable mammographic appearance of the left breast with no findings  suspicious for malignancy.        2. Marked right axillary lymphadenopathy. Recommend ultrasound-guided  biopsy. The fibroglandular pattern of the right breast is however  stable.        BI-RADS CATEGORY:  4, SUSPICIOUS        RECOMMENDATION:  Recommend ultrasound-guided biopsy of the dominant  abnormal appearing right axillary lymph node.      CT Chest With Contrast Diagnostic 01-18-22  FINDINGS:     LUNGS: Solitary mass in the right lower lobe with cavitation.  Significantly smaller today than on the  prior study. 6 mm solid nodule  in the right lung posteriorly on image 30 of the axial series. Other  parenchymal nodules are seen in both lungs and are similar to the  previous study. These are concerning for metastatic nodules.     HEART: Unremarkable.     MEDIASTINUM: No masses. No enlarged lymph nodes.  No fluid collections.     PLEURA: No pleural effusion. No pleural mass or abnormal calcification.  No pneumothorax.     VASCULATURE: No evidence of aneurysm. Filling defect in the right  pulmonary artery and possibly left lower lobe pulmonary artery. This  study was not performed to evaluate for pulmonary embolus, but the  findings are concerning for bilateral pulmonary emboli.     BONES: No acute bony abnormality.     VISUALIZED UPPER ABDOMEN:Please see the CT report for the abdomen and  pelvis.     Other: Right axillary soft tissue mass is present and shows slight  interval growth. It measures 2.47 x 2.16 cm today and previously at the  same level measured approximately 2.7 x 1.89 cm.     IMPRESSION:  1. Study was not performed as a PE protocol, but there appear to be  filling defects in pulmonary arteries bilaterally concerning for  pulmonary emboli.  2. Interval decrease in size of cavitary right lower lobe mass.  3. Stable parenchymal nodules bilaterally.  4. Very slight interval growth of right axillary soft tissue mass.     Results are being relayed to the referring physician.      US Liver 01-18-22  FINDINGS: Sonographic imaging of the liver does not show the mass  previously identified in the liver. I would suggest a follow-up CT scan  for better delineation.     Hepatic flow was hepatopedal.     There is shadowing from the gallbladder fossa.     IMPRESSION:  1. Previously identified mass is not seen on this exam in the liver.  Consider follow-up CT.  2. Shadowing from the gallbladder fossa. In the absence of  cholecystectomy, appearance is concerning for cholelithiasis.      US Venous Doppler Lower  Extremity Bilateral (duplex) 01-20-22  FINDINGS:   There is patent spontaneous flow from the common femoral vein through  the posterior tibial veins.  There was no internal clot or area of noncompressibility.  Normal augmentation was elicited where applicable.     IMPRESSION:  No DVT in the lower extremities on today's exam.       CT Chest Pulmonary Embolism 01-20-22  FINDINGS: Today's study demonstrates opacification of the central  pulmonary vessels.  There are filling defects in the pulmonary arteries bilaterally. First  order arteries. This is most compatible with bilateral pulmonary  emboli..     Soft tissue mass in the right lower lobe with somewhat cavitary center  is again noted and unchanged..     There is no mediastinal lymph node enlargement     No pericardial or pleural effusion.        IMPRESSION:  1. Bilateral pulmonary emboli.  2. Parenchymal nodules bilaterally with dominant mass in the right lower  Lobe..      NM PET/CT Skull Base to Mid Thigh 01-28-22  FINDINGS:      HEAD/NECK:  Area of uptake in the posterior right paraspinal space and to lesser  degree the left paraspinal space appears related to muscle uptake.  No FDG hypermetabolic neck adenopathy.  No FDG hypermetabolic masses.     CHEST:   Numerous bilateral pulmonary nodules appear stable from the previous  chest CT and show no FDG hypermetabolism. This is likely due to nodules  being below size threshold for PET sensitivity.  Cavitary lung mass in the right lung localizing to the superior segment  of the lower lobe shows FDG hypermetabolism with maximum SUV: 2.4. This  is at the lower range for malignancy.  Enlarged right lower axillary region lymph node which is 3.0 cm shows  mild FDG hypermetabolism that is approximately with maximum SUV: 2.4.  This is at the lower range for malignancy.  Low-dose CT demonstrating no change in additional scattered pulmonary  nodules from the previous scan. Coronary artery calcifications are  stable.      ABDOMEN/PELVIS:   Faint area of low attenuation involving the right lobe of liver is  poorly evaluated with low-dose noncontrast CT but shows no focal FDG  hypermetabolism.  Physiologic FDG hypermetabolism seen throughout the solid abdominal  organs.  Physiologic FDG hypermetabolism seen throughout the GI tract.  Physiologic FDG hypermetabolism seen throughout the mesentery,  retroperitoneum and pelvis.  Low dose CT redemonstrates nonobstructing kidney stones. Cholelithiasis  again noted.     BONES: Nonspecific FDG tracer activity. No intense areas of tracer  activity noted.     IMPRESSION:     1. Cavitary mass in the right lung that shows very low-grade FDG  hypermetabolism which is at the lower range for malignancy with maximum  SUV: 2.4.  2. Enlarged right axillary region lymph node with low-grade FDG  hypermetabolism also at the lower range for malignancy with maximum SUV:  2.4.  3. Numerous bilateral pulmonary nodules compatible with metastatic  disease but show no significant FDG hypermetabolism. This may be in part  due to size of nodules being below PET sensitivity threshold.  4. Faint area of low-attenuation right lobe liver poorly evaluated with  noncontrast low-dose CT that shows no obvious intense FDG  hypermetabolism.  5. Other nonacute findings as above on low dose CT.        CTCAP 05-03-22  FINDINGS:    Lungs:  Cavitary mass in the right lower lobe has nearly resolved.  A  right lower lobe pulmonary nodule is 7.4 mm and was previously 7.5 mm.  No change. Image #38.  A left lower lobe pulmonary nodule, image #52, is  9.8 mm and was previously 9.8 mm. No change.    Pleural space:  Unremarkable.  No pneumothorax.  No significant  effusion.    Heart:  Unremarkable.  No cardiomegaly.  No significant pericardial  effusion.  No significant coronary artery calcifications.    Bones/joints:  The bony structures appear stable. No acute bony  findings identified.  No dislocation.    Soft tissues:   Unremarkable.    Vasculature:  Please note, pulmonary emboli noted on the prior exam  are not well evaluated. The previously described filling defects of the  main pulmonary arteries are not evident.  No thoracic aortic aneurysm.    Lymph nodes:  No mediastinal adenopathy by CT size criteria.    Gallbladder and bile ducts:  Cholelithiasis noted.    Other findings:  Other smaller nodules appear stable in size and  configuration.  No new nodules identified.     IMPRESSION:  1.  Near-complete resolution of previously described cavitary mass in  the right lower lobe periphery now only with linear scarring noted.  2.  Index nodules of both lungs are stable from previous exam with no  size increase identified. No new nodules are noted.  3.  Cholelithiasis.  4.  Due to timing of contrast on this study, assessment of pulmonary  emboli not performed. There appears to be significant improvement in  clot burden however in the more central vessels.    FINDINGS:    Lung bases:  Pulmonary nodules of the lung bases.    Mediastinum:  Small hiatal hernia.      ABDOMEN:    Liver:  Low attenuation lesions of the right lobe liver appear of  decreased prominence from the previous exam. Segment 8 liver lesion is  1.1 cm. Segment 7 liver lesion is approximately 0.8 cm.    Gallbladder and bile ducts:  Cholelithiasis.  No ductal dilation.    Pancreas:  Unremarkable.  No mass.  No ductal dilation.    Spleen:  Unremarkable.  No splenomegaly.    Adrenals:  No adrenal masses identified.    Kidneys and ureters:  Unremarkable.  No solid mass.  No  hydronephrosis.    Stomach and bowel:  Sigmoid diverticulosis without evidence of  diverticulitis.  No obstruction.      PELVIS:    Appendix:  Normal appendix.    Bladder:  Unremarkable.  No mass.    Reproductive:  Unremarkable as visualized.      ABDOMEN and PELVIS:    Intraperitoneal space:  Unremarkable.  No free air.  No significant  fluid collection.    Bones/joints:  No acute fracture.  No  dislocation.    Soft tissues:  Unremarkable.    Vasculature:  Unremarkable.  No abdominal aortic aneurysm.    Lymph nodes:  No iliac or retroperitoneal adenopathy.     IMPRESSION:  1.  Small low-attenuation lesions of the right lobe liver appears  decreased prominence of the previous exam. No new no focal liver lesions  identified  2.  Cholelithiasis.  3.  Otherwise stable appearance of the abdomen and pelvis.    CT Abdomen Pelvis With Contrast (08/08/2022 14:36)  FINDINGS:    LUNG BASES:  Unremarkable.  No mass.  No consolidation.      ABDOMEN:    LIVER:  Stable 1 cm right lobe of liver lesion and more posterior 7 mm  lesion.    GALLBLADDER AND BILE DUCTS:  Gallstones.  Gallbladder otherwise  unremarkable.  No ductal dilation.    PANCREAS:  Unremarkable.  No mass.  No ductal dilation.    SPLEEN:  Unremarkable.  No splenomegaly.    ADRENALS:  Unremarkable.  No mass.    KIDNEYS AND URETERS:  Unremarkable.  No solid mass.  No  hydronephrosis.    STOMACH AND BOWEL:  Unremarkable.  No obstruction.  No mucosal  thickening.      PELVIS:    APPENDIX:  No findings to suggest acute appendicitis.    BLADDER:  Unremarkable.  No mass.    REPRODUCTIVE:  Unremarkable as visualized.      ABDOMEN and PELVIS:    INTRAPERITONEAL SPACE:  Unremarkable.  No free air.  No significant  fluid collection.    BONES/JOINTS:  No acute fracture.  No dislocation.    SOFT TISSUES:  Unremarkable.    VASCULATURE:  Unremarkable.  No abdominal aortic aneurysm.    LYMPH NODES:  Unremarkable.  No enlarged lymph nodes.     IMPRESSION:  1.  Gallstones.  Gallbladder otherwise unremarkable.  2.  Stable 1 cm right lobe of liver lesion and more posterior 7 mm  Lesion.    CT Chest With Contrast Diagnostic (08/08/2022 14:40)  FINDINGS:    LUNGS:  Multiple pulmonary nodules again noted including a right lower  lobe pulmonary nodule measuring 5 mm that was 5 mm. Another right lower  lobe pulmonary nodule is 7 mm and was 7 mm. A left lower lobe pulmonary  nodule  is 5 mm and was 5 mm. Other nodules appear stable.    PLEURAL SPACE:  Unremarkable.  No pneumothorax.  No significant  effusion.    HEART:  Unremarkable.  No cardiomegaly.  No significant pericardial  effusion.  No significant coronary artery calcifications.    BONES/JOINTS:  Unremarkable.  No acute fracture.  No dislocation.    SOFT TISSUES:  Unremarkable.    VASCULATURE:  Unremarkable.  No thoracic aortic aneurysm.    LYMPH NODES:  Unremarkable.  No enlarged lymph nodes.     IMPRESSION:    Multiple pulmonary nodules again noted including a right lower lobe  pulmonary nodule measuring 5 mm that was 5 mm. Another right lower lobe  pulmonary nodule is 7 mm and was 7 mm. A left lower lobe pulmonary  nodule is 5 mm and was 5 mm. Other nodules appear stable.    CT Abdomen Pelvis With Contrast (10/31/2022 14:46)  FINDINGS:    LUNG BASES:  Unremarkable.  No mass.  No consolidation.      ABDOMEN:    LIVER:  Stable right lobe of liver low-attenuation lesion measuring  about a centimeter. Stable more posterior right lobe of liver lesion  measuring about 7 mm.    GALLBLADDER AND BILE DUCTS:  Gallstones.  Gallbladder otherwise  unremarkable.  No ductal dilation.    PANCREAS:  Unremarkable.  No mass.  No ductal dilation.    SPLEEN:  Unremarkable.  No splenomegaly.    ADRENALS:  Unremarkable.  No mass.    KIDNEYS AND URETERS:  Unremarkable.  No solid mass.  No  hydronephrosis.    STOMACH AND BOWEL:  Scattered diverticula in the colon.  No  diverticulitis.  No obstruction.      PELVIS:    APPENDIX:  No findings to suggest acute appendicitis.    BLADDER:  Unremarkable.  No mass.    REPRODUCTIVE:  Unremarkable as visualized.      ABDOMEN and PELVIS:    INTRAPERITONEAL SPACE:  Unremarkable.  No free air.  No significant  fluid collection.    BONES/JOINTS:  No acute fracture.  No dislocation.    SOFT TISSUES:  Unremarkable.    VASCULATURE:  Unremarkable.  No abdominal aortic aneurysm.    LYMPH NODES:  Unremarkable.  No enlarged lymph  nodes.     IMPRESSION:  1.  Gallstones.  Gallbladder otherwise unremarkable.  2.  Stable right lobe of liver low-attenuation lesion measuring about a  centimeter. Stable more posterior right lobe of liver lesion measuring  about 7 mm.    CT Chest With Contrast Diagnostic (10/31/2022 14:47)  FINDINGS:    LUNGS:  Stable subpleural right middle lobe pulmonary nodule measuring  5 mm. Stable right lower lobe pulmonary nodule measuring 6 mm. Other  bilateral pulmonary nodules appear stable in size and number.    PLEURAL SPACE:  Unremarkable.  No pneumothorax.  No significant  effusion.    HEART:  Unremarkable.  No cardiomegaly.  No significant pericardial  effusion.  No significant coronary artery calcifications.    BONES/JOINTS:  Unremarkable.  No acute fracture.  No dislocation.    SOFT TISSUES:  Right upper breast density again noted.    VASCULATURE:  Unremarkable.  No thoracic aortic aneurysm.    LYMPH NODES:  Unremarkable.  No enlarged lymph nodes.     IMPRESSION:    Stable subpleural right middle lobe pulmonary nodule measuring 5 mm.  Stable right lower lobe pulmonary nodule measuring 6 mm. Other bilateral  pulmonary nodules appear stable in size and number.    CT chest with contrast 1/25/23  FINDINGS:     LUNGS: Multiple parenchymal nodules are present bilaterally and are  stable in size, number, and appearance. Largest nodule measuring  approximately 6 mm.     HEART: Mild coronary artery calcifications.     MEDIASTINUM: No masses. No enlarged lymph nodes.  No fluid collections.     PLEURA: No pleural effusions.     VASCULATURE: No evidence of aneurysm.     BONES: No acute bony abnormality.     VISUALIZED UPPER ABDOMEN:Please see the report for the CT of the abdomen  and pelvis.     Other: 8.5 mm left axillary lymph node stable.     IMPRESSION:     1. Multiple parenchymal nodules in both lungs are stable.  2. 8.5 mm lymph node in the left axilla, stable.    CT abdomen pelvis with contrast 1/25/23  FINDINGS:      Lower thorax: Parenchymal nodules in both lungs similar to the previous  exam.     Abdomen:     Liver: Previous identified area of decreased attenuation in the right  lobe of the liver is not localized on today's exam.     Gallbladder: Cholelithiasis.     Pancreas: Unremarkable. No mass or ductal dilatation.     Spleen: Homogeneous. No splenomegaly.     Adrenals: No mass.     Kidneys/ureters: Nonobstructing right intrarenal stone.     GI tract: Moderate stool. Sigmoid diverticuli without diverticulitis.     MESENTERY: No free fluid, walled off fluid collections, mesenteric  stranding, or enlarged lymph nodes        Vasculature: No evidence of aneurysm.     Abdominal wall: No focal hernia or mass.        Bladder: No focal mass or significant wall thickening     Reproductive: Unremarkable as visualized     Bones: No acute bony abnormality.     IMPRESSION:     1. No evidence of new interval development of metastatic disease.  Previously identified hepatic lesion is not seen on today's exam.     2.Cholelithiasis.     3. Nonobstructing right intrarenal stone.    Mammo Diagnostic Digital Tomosynthesis Bilateral With CAD (02/08/2023 14:55)  FINDINGS: There are scattered areas of fibroglandular density.     The bilateral fibroglandular pattern is stable in appearance including  postoperative changes in the right 12:00 region. Skin thickening  involving the right breast is again noted and likely treatment related.  Multiple surgical clips are now noted in the right axilla. The patient's  Port-A-Cath is incompletely imaged in the posterior superior aspect of  the left breast. No new or suspicious findings are identified in either  breast.     IMPRESSION:  Benign bilateral mammographic findings.        BI-RADS CATEGORY:  2, BENIGN        RECOMMENDATION:  Recommend the patient continue annual bilateral  mammographic evaluation.    CT Abdomen Pelvis With Contrast (04/13/2023 14:51)  FINDINGS:    Lung bases:  Refer to chest CT  for characterization of lung nodules in  the partially imaged lower chest.    Mediastinum:  Moderate hiatal hernia.      ABDOMEN:    Liver:  No definite focal liver lesion identified.    Gallbladder and bile ducts:  Cholelithiasis.  No ductal dilation.    Pancreas:  Unremarkable.  No mass.  No ductal dilation.    Spleen:  Unremarkable.  No splenomegaly.    Adrenals:  No adrenal masses.    Kidneys and ureters:  Tiny nonobstructing right kidney stone. No  hydronephrosis.    Stomach and bowel:  Gastric antrum wall thickening likely due to  incomplete distention.  Small splenule is again noted adjacent to the  splenic flexure of the colon.  Sigmoid diverticulosis. No evidence of  acute diverticulitis.      PELVIS:    Appendix:  Normal appendix.    Bladder:  Unremarkable.  No mass.    Reproductive:  Unremarkable as visualized.      ABDOMEN and PELVIS:    Intraperitoneal space:  No pneumoperitoneum identified.  No  significant fluid collection.    Bones/joints:  Stable appearance of the bony structures. Mild  degenerative changes lumbar spine but no acute osseous findings.  No  dislocation.    Soft tissues:  Unremarkable.    Vasculature:  Unremarkable.  No abdominal aortic aneurysm.    Lymph nodes:  No iliac or retroperitoneal lymphadenopathy.     IMPRESSION:  1.  Cholelithiasis.  2.  No evidence of metastatic disease to abdomen or pelvis.  3.  Other nonacute/incidental findings as detailed above which appear  stable from previous.    CT Chest With Contrast Diagnostic (04/13/2023 14:51)  FINDINGS:    Lungs:  Small nodule along the right major fissure in the right lung,  5.2 mm and was previously 5.3 mm; image #33.  Parenchymal nodule right  lower lobe is 6.7 mm and was previously 6.9 mm, image #33, no change.   5.9 mm nodule right lower lobe was previously 6.5 mm indicating no  interval change.  Other smaller nodules of the right lung appears  stable.  7.5 mm nodule left lower lobe, image #55, was previously 8.2  mm.  A  5.7 mm nodule left lower lobe, image #40, was previously 5.9 mm.  No significant change.    Pleural space:  Unremarkable.  No pneumothorax.  No significant  effusion.    Heart:  Mild cardiomegaly.  No significant pericardial effusion.  No  significant coronary artery calcifications.    Mediastinum:  Moderate hiatal hernia again noted.  No enlarged  mediastinal or hilar lymph nodes identified.    Bones/joints:  Unremarkable.  No acute fracture.  No dislocation.    Soft tissues:  Stable treatment-related skin thickening of the right  breast.    Vasculature:  Unremarkable.  No thoracic aortic aneurysm.    Lymph nodes:  See above.    Gallbladder and bile ducts:  Cholelithiasis.    Tubes, lines and devices:  Left Port-A-Cath noted.    Other findings:  Calcified granulomas are stable.     IMPRESSION:  1.  No interval change in size of multiple bilateral pulmonary  parenchymal nodules. Index nodules as detailed above. Some nodules may  be slightly smaller than previous or could be due to differences in  slice selection.  2.  No new nodules are identified.  3.  No thoracic adenopathy.  4.  Other incidental and nonacute findings detailed above appear stable  from previous.    RECENT LABS:  Lab Results   Component Value Date    WBC 5.69 04/20/2023    HGB 13.7 04/20/2023    HCT 43.5 04/20/2023    MCV 90.1 04/20/2023    RDW 13.4 04/20/2023     04/20/2023    NEUTRORELPCT 43.2 04/20/2023    LYMPHORELPCT 33.2 04/20/2023    MONORELPCT 7.9 04/20/2023    EOSRELPCT 14.4 (H) 04/20/2023    BASORELPCT 1.1 04/20/2023    NEUTROABS 2.46 04/20/2023    LYMPHSABS 1.89 04/20/2023       Lab Results   Component Value Date     04/20/2023    K 4.1 04/20/2023    CO2 23.2 04/20/2023     (H) 04/20/2023    BUN 11 04/20/2023    CREATININE 0.82 04/20/2023    EGFRIFNONA 87 02/24/2022    GLUCOSE 80 04/20/2023    CALCIUM 9.4 04/20/2023    ALKPHOS 70 04/20/2023    AST 17 04/20/2023    ALT 15 04/20/2023    BILITOT 0.4 04/20/2023    ALBUMIN  4.2 04/20/2023    PROTEINTOT 6.5 04/20/2023    MG 2.0 12/17/2021     Lab Results   Component Value Date    TSH 3.500 04/20/2023     Lab Results   Component Value Date    FERRITIN 169.00 (H) 01/20/2022    IRON 102 01/20/2022    TIBC 264 (L) 01/20/2022    LABIRON 39 01/20/2022    WGRELJSF64 437 01/20/2022    FOLATE 16.40 01/20/2022     Lab Results   Component Value Date     (H) 01/20/2022     ASSESSMENT & PLAN:  Yadira Flowers is a very pleasant 66 y.o. female with a history of cervical cancer, breast cancer and malignant melanoma.    1. History of Breast Cancer:  - Ms. Onur Burgos had Stage IA ([id number]) moderately differentiated invasive ductal carcinoma of the right breast diagnosed in October 2015.  - She underwent R lympectomy and SLNBx performed by Dr. Steve Isbell 10-22-15 and then received adjuvant radiation in [place].  - After radiation, she was started on Arimidex which was then switched to Exemestane.  She took Exemestane for almost 5 years. Unfortunately, she developed vaginal bleeding and hematuria related to vaginal atrophy related to hormonal blockade.  Given this, stopped Exemestane.  She is doing much better in this regard since stopping hormonal therapy.  - Mammogram 2/8/23 without cause for concern aside from R axillary LAD.   -Repeat bilateral diagnostic mammogram due p 2//28/24.    2.  Metastatic malignant melanoma:  -  S/p resection of a primary lesion on her back performed by Dr. Catrachito Amaro of Dermatology in [place] in 2004.  -  S/p FNA R axillary LN which was concerning for melanoma.  Excisional biopsy confirmed metastatic malignant melanoma.  - Suspect her lung and liver lesions are also related to her melanoma.  - She has seen Dr. Her and he performed bronchoscopy on 02/23/2022. Pathology was negative for malignant cells.  - Liver aspiration showed abscess which grew Klebsiella, suspect this was a secondary infection.    -  Plan to watch all of these areas as we  proceed with treatment.  -  Sent  excised LN tissue for CARIS testing.  -  PET-CT showed no significant FDG uptake but re-demonstrated abnormalities in the R axilla, RLL cavitary mass, Vargas lung nodules and liver.    -  MRI Brain showed no acute findings in the head/brain.  -  Recommended initial therapy with immunotherapy.  We discussed different options for immunotherapy including single agent PD-L1 treatment or combination with Yervoy.  We recommended combination with Yervoy for better RR, PFS and OS even with increased risk of immune-mediated side effects.  We discussed the initially approved FDA dosing v. Altered dosing with lower dose IPI/higher dosed Nivo.  The latter has been shown to have fewer side effects and is thought to likely have similar efficacy (though trials weren't really powered to definitively show that).  After a lengthy discussion, we decided to proceed with 4 cycles of Ipi 1 mg/kg / Nivo 3 mg/kg q 21d followed by Nivolumab single agent.  We discussed potential risks, benefits and side effects extensively and she decided to proceed.  -  She completed 4 cycles of Ipi / Nivo and tolerated treatment very well.  -  Repeat CT imaging done 5-3-22 after 4 cycles Ipi/Nivo showed an excellent partial response to treatment.  Have continued with single agent Nivolumab as planned which she is tolerating well.   -Repeat CT CAP from 4/13/23 (summarized above) again showing stable disease with no evidence of new interval development of metastatic disease.   -Therefore, will continue with Nivolumab therapy as planned.  -  Will repeat CT CAP after a 3 month interval.     3. Constipation  - Currently denies.  Continue Senna 2 tabs BID. Also advised to use Miralax as needed.     4. Bilateral pulmonary emboli:  -  CTPE protocol 1-20-22  confirmed bilateral pulmonary emboli  -  BLE dopplers negative for DVT.  -  Continue Eliquis 5 mg BID.    -  We previously discussed length of therapy.  She watched her   die with PE and her mother has h/o PEs as well.  Presumably the cancer was the risk factor for development of PE and since her cancer is incurable,  have recommended she continue Eliquis indefinately as long as she continues without significant side effects.  She started complaining of difficulty with easy bleeding with minor trauma.  It had been over 6 months that she has had full anticoagulation.  Given worsening side effects, decided to continue Eliquis, but reduced dose to 2.5 mg twice a day.  She is doing well on this dose. Will continue.    5.  H/o Colon polyps:  -  Dr. Maldonado performed c-scope 12-09-20 with discovery of 8 hyperplastic polyps in the distal rectum as well as diverticulosis.  He recommended repeat c-scope after 5 years.    6.  H/o cervical cancer treated with cryotherapy at [facility] in 1975:  -  Followed up with Dr. Manav Real of gynecology.  She says she had exam including pap smear and then pelvic U/S which was unrevealing.    7. Prophylaxis:  -  Had COVID vaccine x 2 (Pfizer).  She received Evusheld on 4-7-22.. Recommended booster. Received 2022 flu vaccine.     ACO / DWAYNE/Other  Quality measures  -  Yadira Flowers received 2022 flu vaccine.    -  Yadira Flowers reports a pain score of 0.  Given her pain assessment as noted, treatment options were discussed and the following options were decided upon as a follow-up plan to address the patient's pain: No intervention needed at this time.  -  Current outpatient and discharge medications have been reconciled for the patient.  Reviewed by: Loreto Stone MD      8.  Follow up:  - Continue Nivolumab as planned.  -MD follow up in 3 months with CT CAP prior along with  CBCD, CMP, TSH.  -  Continue Protonix. If symptoms flare, consider increasing dose to 40 mg QAM.  -  Mammogram due p 2/8/24    I spent 30 minutes with Yadira Flowers today.  In the office today, more than 50% of this time was spent face-to-face with her  in counseling /  coordination of care, reviewing her medical history and counseling on the current treatment plan.  All questions were answered to her satisfaction      Electronically Signed by:  Loreto Stone MD      CC:     MD Manav Cotto MD Aaron House, MD            Yes